# Patient Record
Sex: FEMALE | Race: WHITE | NOT HISPANIC OR LATINO | Employment: OTHER | URBAN - METROPOLITAN AREA
[De-identification: names, ages, dates, MRNs, and addresses within clinical notes are randomized per-mention and may not be internally consistent; named-entity substitution may affect disease eponyms.]

---

## 2017-01-18 ENCOUNTER — CLINICAL SUPPORT (OUTPATIENT)
Dept: INFECTIOUS DISEASES | Facility: CLINIC | Age: 62
End: 2017-01-18
Payer: COMMERCIAL

## 2017-01-18 DIAGNOSIS — Z71.84 TRAVEL ADVICE ENCOUNTER: ICD-10-CM

## 2017-01-18 DIAGNOSIS — Z23 IMMUNIZATION DUE: ICD-10-CM

## 2017-01-18 PROCEDURE — 90632 HEPA VACCINE ADULT IM: CPT | Mod: S$GLB,,, | Performed by: INTERNAL MEDICINE

## 2017-01-18 PROCEDURE — 99999 PR PBB SHADOW E&M-EST. PATIENT-LVL I: CPT | Mod: PBBFAC,,,

## 2017-01-18 PROCEDURE — 90471 IMMUNIZATION ADMIN: CPT | Mod: S$GLB,,, | Performed by: INTERNAL MEDICINE

## 2017-03-08 ENCOUNTER — PATIENT MESSAGE (OUTPATIENT)
Dept: ADMINISTRATIVE | Facility: HOSPITAL | Age: 62
End: 2017-03-08

## 2017-03-20 DIAGNOSIS — F41.9 ANXIETY: ICD-10-CM

## 2017-03-20 RX ORDER — DULOXETIN HYDROCHLORIDE 30 MG/1
CAPSULE, DELAYED RELEASE ORAL
Qty: 90 CAPSULE | Refills: 0 | Status: SHIPPED | OUTPATIENT
Start: 2017-03-20 | End: 2017-03-28 | Stop reason: SDUPTHER

## 2017-03-20 NOTE — TELEPHONE ENCOUNTER
Spoke with patient about getting a new PCP.  Patient has an appointment with Dr Lucia on 03/28/17.

## 2017-03-28 ENCOUNTER — LAB VISIT (OUTPATIENT)
Dept: LAB | Facility: HOSPITAL | Age: 62
End: 2017-03-28
Attending: INTERNAL MEDICINE
Payer: COMMERCIAL

## 2017-03-28 ENCOUNTER — OFFICE VISIT (OUTPATIENT)
Dept: INTERNAL MEDICINE | Facility: CLINIC | Age: 62
End: 2017-03-28
Payer: COMMERCIAL

## 2017-03-28 VITALS
SYSTOLIC BLOOD PRESSURE: 122 MMHG | HEART RATE: 60 BPM | HEIGHT: 66 IN | OXYGEN SATURATION: 97 % | WEIGHT: 123.25 LBS | BODY MASS INDEX: 19.81 KG/M2 | DIASTOLIC BLOOD PRESSURE: 68 MMHG

## 2017-03-28 DIAGNOSIS — E55.9 VITAMIN D DEFICIENCY: ICD-10-CM

## 2017-03-28 DIAGNOSIS — Z13.220 ENCOUNTER FOR LIPID SCREENING FOR CARDIOVASCULAR DISEASE: ICD-10-CM

## 2017-03-28 DIAGNOSIS — Z00.00 ANNUAL PHYSICAL EXAM: Primary | ICD-10-CM

## 2017-03-28 DIAGNOSIS — Z13.6 ENCOUNTER FOR LIPID SCREENING FOR CARDIOVASCULAR DISEASE: ICD-10-CM

## 2017-03-28 DIAGNOSIS — F41.9 ANXIETY: ICD-10-CM

## 2017-03-28 DIAGNOSIS — F51.04 CHRONIC INSOMNIA: ICD-10-CM

## 2017-03-28 LAB
25(OH)D3+25(OH)D2 SERPL-MCNC: 42 NG/ML
ALBUMIN SERPL BCP-MCNC: 3.6 G/DL
ALP SERPL-CCNC: 63 U/L
ALT SERPL W/O P-5'-P-CCNC: 14 U/L
ANION GAP SERPL CALC-SCNC: 7 MMOL/L
AST SERPL-CCNC: 20 U/L
BILIRUB SERPL-MCNC: 0.8 MG/DL
BUN SERPL-MCNC: 18 MG/DL
CALCIUM SERPL-MCNC: 9.4 MG/DL
CHLORIDE SERPL-SCNC: 104 MMOL/L
CO2 SERPL-SCNC: 28 MMOL/L
CREAT SERPL-MCNC: 0.8 MG/DL
EST. GFR  (AFRICAN AMERICAN): >60 ML/MIN/1.73 M^2
EST. GFR  (NON AFRICAN AMERICAN): >60 ML/MIN/1.73 M^2
GLUCOSE SERPL-MCNC: 87 MG/DL
HDLC SERPL-MCNC: 155 MG/DL
HDLC SERPL-MCNC: 64 MG/DL
POTASSIUM SERPL-SCNC: 4.1 MMOL/L
PROT SERPL-MCNC: 6.9 G/DL
SODIUM SERPL-SCNC: 139 MMOL/L
T3 SERPL-MCNC: 97 NG/DL
T4 FREE SERPL-MCNC: 1.06 NG/DL
TSH SERPL DL<=0.005 MIU/L-ACNC: 0.83 UIU/ML

## 2017-03-28 PROCEDURE — 83701 LIPOPROTEIN BLD HR FRACTION: CPT

## 2017-03-28 PROCEDURE — 36415 COLL VENOUS BLD VENIPUNCTURE: CPT | Mod: PO

## 2017-03-28 PROCEDURE — 84480 ASSAY TRIIODOTHYRONINE (T3): CPT

## 2017-03-28 PROCEDURE — 84443 ASSAY THYROID STIM HORMONE: CPT

## 2017-03-28 PROCEDURE — 82306 VITAMIN D 25 HYDROXY: CPT

## 2017-03-28 PROCEDURE — 99999 PR PBB SHADOW E&M-EST. PATIENT-LVL III: CPT | Mod: PBBFAC,,, | Performed by: INTERNAL MEDICINE

## 2017-03-28 PROCEDURE — 80053 COMPREHEN METABOLIC PANEL: CPT

## 2017-03-28 PROCEDURE — 84439 ASSAY OF FREE THYROXINE: CPT

## 2017-03-28 PROCEDURE — 82465 ASSAY BLD/SERUM CHOLESTEROL: CPT

## 2017-03-28 PROCEDURE — 83718 ASSAY OF LIPOPROTEIN: CPT

## 2017-03-28 PROCEDURE — 99396 PREV VISIT EST AGE 40-64: CPT | Mod: S$GLB,,, | Performed by: INTERNAL MEDICINE

## 2017-03-28 RX ORDER — DULOXETIN HYDROCHLORIDE 30 MG/1
30 CAPSULE, DELAYED RELEASE ORAL DAILY
Qty: 90 CAPSULE | Refills: 0 | Status: SHIPPED | OUTPATIENT
Start: 2017-03-28 | End: 2017-07-11

## 2017-03-28 NOTE — MR AVS SNAPSHOT
[facility]   [place]  [place] [address]  Phone: [phone]  Fax: [phone]                  Gabriela Skinner   3/28/2017 1:20 PM   Office Visit    Description:  Female : 1955   Provider:  Zachery Lucia MD   Department:  [facility]           Reason for Visit     Establish Care           Diagnoses this Visit        Comments    Annual physical exam    -  Primary     Anxiety         Chronic insomnia         Encounter for lipid screening for cardiovascular disease         Vitamin D deficiency                To Do List           Future Appointments        Provider Department Dept Phone    3/28/2017 2:30 PM [facility] [phone]    2017 1:00 PM Zachery Lucia MD [facility] [phone]      Goals (5 Years of Data)     None       These Medications        Disp Refills Start End    duloxetine (CYMBALTA) 30 MG capsule 90 capsule 0 3/28/2017     Take 1 capsule (30 mg total) by mouth once daily. - Oral    [id number] [place] [address] [address] [facility] AT [facility] [address] Ph #: [phone]         [facility]     [facility] -  Assistance  Registered nurses in the [facility] provide clinical advisement, health education, appointment booking, and other advisory services.  Call for this free service at [phone].             Medications           Message regarding Medications     Verify the changes and/or additions to your medication regime listed below are the same as discussed with your clinician today.  If any of these changes or additions are incorrect, please notify your healthcare provider.        CHANGE how you are taking these medications     Start Taking Instead of    duloxetine (CYMBALTA) 30 MG capsule duloxetine (CYMBALTA) 30 MG capsule    Dosage:  Take 1 capsule (30 mg total) by mouth once daily.  "Dosage:  TAKE 1 CAPSULE BY MOUTH ONCE A DAY    Reason for Change:  Reorder       STOP taking these medications     norethindrone-ethinyl estradiol (FEMHRT 1/5) 1-5 mg-mcg Tab Take 1 tablet by mouth.    risedronate (ACTONEL) 150 MG tablet Take 1 tablet by mouth.           Verify that the below list of medications is an accurate representation of the medications you are currently taking.  If none reported, the list may be blank. If incorrect, please contact your healthcare provider. Carry this list with you in case of emergency.           Current Medications     duloxetine (CYMBALTA) 30 MG capsule Take 1 capsule (30 mg total) by mouth once daily.    LOPREEZA 1-0.5 mg per tablet ONE T PO QD           Clinical Reference Information           Your Vitals Were     BP Pulse Height Weight SpO2 BMI    122/68 (BP Location: Right arm, Patient Position: Sitting, BP Method: Manual) 60 5' 6" (1.676 m) 55.9 kg (123 lb 3.8 oz) 97% 19.89 kg/m2      Blood Pressure          Most Recent Value    BP  122/68      Allergies as of 3/28/2017     No Known Drug Allergies      Immunizations Administered on Date of Encounter - 3/28/2017     None      Orders Placed During Today's Visit     Future Labs/Procedures Expected by Expires    Cholesterol, total  3/28/2017 5/27/2018    Comprehensive metabolic panel  3/28/2017 6/26/2017    HDL CHOLESTEROL  3/28/2017 5/27/2018    LDL CHOLESTEROL, DIRECT  3/28/2017 5/27/2018    T3  3/28/2017 6/26/2017    T4, free  3/28/2017 6/26/2017    TSH  3/28/2017 5/27/2018    Vitamin D  3/28/2017 5/27/2018      Instructions            Anxiety Reaction  Anxiety is the feeling we all get when we think something bad might happen. It is a normal response to stress and usually causes only a mild reaction. When anxiety becomes more severe, it can interfere with daily life. In some cases, you may not even be aware of what it is youre anxious about. There may also be a genetic link or it may be a learned behavior in the " home.  Both psychological and physical triggers cause stress reaction. It's often a response to fear or emotional stress, real or imagined. This stress may come from home, family, work, or social relationships.  During an anxiety reaction, you may feel:  · Helpless  · Nervous  · Depressed  · Irritable  Your body may show signs of anxiety in many ways. You may experience:  · Dry mouth  · Shakiness  · Dizziness  · Weakness  · Trouble breathing  · Breathing fast (hyperventilating)  · Chest pressure  · Sweating  · Headache  · Nausea  · Diarrhea  · Tiredness  · Inability to sleep  · Sexual problems  Home care  · Try to locate the sources of stress in your life. They may not be obvious. These may include:  ¨ Daily hassles of life (traffic jams, missed appointments, car troubles, etc.)  ¨ Major life changes, both good (new baby, job promotion) and bad (loss of job, loss of loved one)  ¨ Overload: feeling that you have too many responsibilities and can't take care of all of them at once  ¨ Feeling helpless, feeling that your problems are beyond what youre able to solve  · Notice how your body reacts to stress. Learn to listen to your body signals. This will help you take action before the stress becomes severe.  · When you can, do something about the source of your stress. (Avoid hassles, limit the amount of change that happens in your life at one time and take a break when you feel overloaded).  · Unfortunately, many stressful situations can't be avoided. It is necessary to learn how to better manage stress. There are many proven methods that will reduce your anxiety. These include simple things like exercise, good nutrition and adequate rest. Also, there are certain techniques that are helpful:  ¨ Relaxation  ¨ Breathing exercises  ¨ Visualization  ¨ Biofeedback  ¨ Meditation  For more information about this, consult your doctor or go to a local bookstore and review the many books and tapes available on this  "subject.  Follow-up care  If you feel that your anxiety is not responding to self-help measures, contact your doctor or make an appointment with a counselor. You may need short-term psychological counseling and temporary medicine to help you manage stress.  Call 911  Call your healthcare provider right away if any of these occur:  · Trouble breathing  · Confusion  · Drowsiness or trouble wakening  · Fainting or loss of consciousness  · Rapid heart rate  · Seizure  · New chest pain that becomes more severe, lasts longer, or spreads into your shoulder, arm, neck, jaw, or back  When to seek medical advice  Call your healthcare provider right away if any of these occur:  · Your symptoms get worse  · Severe headache not relieved by rest and mild pain reliever  Date Last Reviewed: 9/29/2015  © 7927-3966 [employer]. [address] [place] [address], [place] [address]. All rights reserved. This information is not intended as a substitute for professional medical care. Always follow your healthcare professional's instructions.        Stress Relief: A Positive Lifestyle  Learning to manage stress doesn't happen overnight. It's a process. The more you keep at it, the more you'll feel in control of daily events.     Leave plenty of time for family fun. Have a cookout or play games together. And try to share at least one meal each day.        Set limits  Trying to fit too much into a day is a major cause of stress. Setting limits will help you feel more in control. This sometimes means saying no--to people and even to things you might want to do. This can be hard at times. But knowing your priorities can help you make choices.  Know your priorities  Using your time and energy wisely is a good way to control stress. Save time for the things that matter most in your life. Ask yourself: Do I really need to do this? Do I want to do this? If you answer "yes," go ahead. But keep in mind you can also answer "no."  Learn to accept " support  Everybody needs support now and then. So don't feel embarrassed to ask for help when you need it. Most people are glad to lend a hand. And asking for help can open up new lines of communication and friendships.  Stress and children  Be careful not to take stress out on your children. They may not understand why you're stressed. But they can sense your moods. Be aware that many children--especially teenagers--are under stress, too. So plan time to talk with your kids. Ask them about school and any problems they're having. Finally, make sure they have plenty of time for just being kids and having fun.  Be part of your community  Taking part in community or destini-based events can offer a sense of belonging. It also helps put you in touch with active and caring people nearby. So whether it's a cleanup day at a local park or taking meals to the elderly, try to reach out to friends and neighbors. Just remember: Taking time for yourself once in a while makes it easier to help others later on.   Date Last Reviewed: 12/22/2014  © 6407-2288 [employer]. [address] [place] [address]. All rights reserved. This information is not intended as a substitute for professional medical care. Always follow your healthcare professional's instructions.        Stress Relief: Activities  When you're feeling stressed, some simple exercises can provide relief right away. These exercises are not the kind you need sweatpants for. You can do them almost anytime and anywhere. They will help you feel more relaxed.  Walking    Taking a walk is a great way to fight stress. Walking offers a chance to take a break from a stressful situation. It can also give you a few minutes to think things through. Even a short walk can help you feel better. That's because walking is a positive action that you control.     Stretching  Muscle tension is a common response to stress. Stretching is a simple way to loosen up. Try  these:  · Neck stretch. Sit up straight and tuck in your chin. Place your left hand on the right side of your head. Gently pull your head to the left and hold for 10 seconds. Switch sides and repeat the exercise.  · Shoulder and arm stretch. Put your hands together and lock your fingers. Then raise your hands above your head, palms upward. Hold for 15 seconds and relax. Repeat 3 times.  Deep breathing  Deep breathing is a simple method for relieving tension. Use 3 deep breaths each time you do this exercise.  · Inhale. Breathe in slowly and deeply through your nose. Take in as much air as possible. Hold for 3 seconds.  · Exhale. Breathe out slowly through your mouth. Try pursing your lips as if you were going to whistle. This helps control how fast you exhale.  Date Last Reviewed: 12/22/2014  © 3835-4086 [employer]. [address] [place] [address], [place] [address]. All rights reserved. This information is not intended as a substitute for professional medical care. Always follow your healthcare professional's instructions.        Stress Relief: Changing Your Response  You are the only person responsible for your thoughts and actions. This simple idea is your most powerful tool for managing stress. Start by having realistic expectations. Then learn to recognize what you can--and can't--control. Finally, think about ways to change your response. With practice, you can learn to let go of stressful ways of thinking.  Have realistic expectations    When it comes to events that cause you stress, ask yourself:  · What are my expectations?  · How likely is it that my expectations, good or bad, will be met? Are they realistic?  · If they aren't met, do I have to respond by feeling badly? How can I work with other outcomes?  Understand what you can do  To get better at managing stress, try these tips:  · Put the stressor in perspective. Will being late to work really get you fired?  · Be flexible and look for answers.  "If you're stuck in traffic, try calling to let people know you're on the way.  · Plan ahead for next time. If being late is a worry, plan to leave a few minutes earlier.  Make mountains into molehills  A common cause of stress is feeling as if you have to solve all your problems at once. To shake this feeling, learn to take things one step at a time. Try to break big problems into smaller tasks that you can handle. That way, worries that seem like mountains become little hills you can climb over. Remember, taking small steps will carry you forward.   Date Last Reviewed: 12/22/2014 © 2000-2016 [employer]. [address] [place] [address], [place] [address]. All rights reserved. This information is not intended as a substitute for professional medical care. Always follow your healthcare professional's instructions.        Stress Relief: Relaxation  Focusing the mind helps provide stress relief. Taking 5 to 10 minutes to practice relaxation each day helps you feel more refreshed. The following exercises can be done almost anywhere. Try one or more until you find what works best for you.  Calm your mind    Find a quiet place where you won't be disturbed. Then try the following:  · Sit comfortably. Take off your shoes. Turn off your cell phone and pager. Take a few deep breaths.  · Focus your mind on one peaceful thought, image, or word. Then try to hold that thought for 5 minutes.  · When other thoughts enter your mind, relax and refocus. Let the invading thoughts fall away.  · When you're done, stand up slowly and stretch your arms over your head. With practice, this exercise can help you feel restored.     Calm your body  With practice, you can use mental cues to tell your body how to feel.  · Sit comfortably and clear your mind. A few deep breaths will help.  · Mentally focus on your left hand and repeat to yourself, "My left hand feels warm and heavy." Keep doing this until your hand does feel heavier and " warmer.  · Repeat the exercise using your right hand. Then focus on your arms, legs, and feet until your whole body feels relaxed.  · When you're done, stand up slowly and stretch your arms overhead.     Visualization  Visualization is like taking a mental vacation. It frees your mind while keeping your body in a calm state. To get started, picture yourself feeling warm and relaxed. Choose a peaceful setting that appeals to you and fill in the details. If you imagine a tropical beach, listen to the waves on the shore. Feel the sun on your face. Dig your toes in the sand. By using the power of your mind, you can take a soothing break when you need to.  Date Last Reviewed: 12/22/2014 © 2000-2016 [employer]. [address] [place] [address], [place] [address]. All rights reserved. This information is not intended as a substitute for professional medical care. Always follow your healthcare professional's instructions.        Identifying Causes of Stress    Things that cause stress (stressors) can be everyday events, major life changes, or a combination of things. They can be either happy or sad events. Knowing your stressors will help you find ways to manage your stress.  Minor hassles  Daily life is filled with little annoyances. Spilled milk, lost keys, a missed phone call. These are rarely earth-shattering events. But the stress they cause can build up over time. Minor hassles also seem more painful if you're under long-term stress.  Major changes  A move, a divorce, or the loss of a loved one are major changes. They require you to adapt to a new lifestyle. You may fear an unknown future or worry about whether you'll be able to cope. Even positive events, like marriage or the birth of a baby, can cause major stress.  Stress overload  Being pulled in many directions can be exhausting--especially when you're juggling work and family. Working late, taking kids to soccer, paying bills, and buying groceries may feel  "completely overwhelming. For a time, life can seem totally out of control.  Feeling helpless  Feeling helpless is a sign of long-term stress. You may feel like you have no control over your life. Even a faraway disaster told on the evening news may seem like it's part of your own troubles. Over time, these feelings may lead to depression. If you feel "down" for weeks, talk with your doctor or a counselor. Depression can be treated.  Date Last Reviewed: 12/22/2014  © 4918-1622 "[employer]". [address] [place] [address]. All rights reserved. This information is not intended as a substitute for professional medical care. Always follow your healthcare professional's instructions.             Language Assistance Services     ATTENTION: Language assistance services are available, free of charge. Please call [phone].      ATENCIÓN: Si vika lopez, tiene a atkinson disposición servicios gratuitos de asistencia lingüística. Llame al [phone].     DONTAE Ý: N?u b?n nói Ti?ng Vi?t, có các d?ch v? h? tr? ngôn ng? mi?n phí dành cho b?n. G?i s? [phone].         [facility] complies with applicable Federal civil rights laws and does not discriminate on the basis of race, color, national origin, age, disability, or sex.        "

## 2017-03-28 NOTE — PATIENT INSTRUCTIONS
Anxiety Reaction  Anxiety is the feeling we all get when we think something bad might happen. It is a normal response to stress and usually causes only a mild reaction. When anxiety becomes more severe, it can interfere with daily life. In some cases, you may not even be aware of what it is youre anxious about. There may also be a genetic link or it may be a learned behavior in the home.  Both psychological and physical triggers cause stress reaction. It's often a response to fear or emotional stress, real or imagined. This stress may come from home, family, work, or social relationships.  During an anxiety reaction, you may feel:  · Helpless  · Nervous  · Depressed  · Irritable  Your body may show signs of anxiety in many ways. You may experience:  · Dry mouth  · Shakiness  · Dizziness  · Weakness  · Trouble breathing  · Breathing fast (hyperventilating)  · Chest pressure  · Sweating  · Headache  · Nausea  · Diarrhea  · Tiredness  · Inability to sleep  · Sexual problems  Home care  · Try to locate the sources of stress in your life. They may not be obvious. These may include:  ¨ Daily hassles of life (traffic jams, missed appointments, car troubles, etc.)  ¨ Major life changes, both good (new baby, job promotion) and bad (loss of job, loss of loved one)  ¨ Overload: feeling that you have too many responsibilities and can't take care of all of them at once  ¨ Feeling helpless, feeling that your problems are beyond what youre able to solve  · Notice how your body reacts to stress. Learn to listen to your body signals. This will help you take action before the stress becomes severe.  · When you can, do something about the source of your stress. (Avoid hassles, limit the amount of change that happens in your life at one time and take a break when you feel overloaded).  · Unfortunately, many stressful situations can't be avoided. It is necessary to learn how to better manage stress. There are many proven  methods that will reduce your anxiety. These include simple things like exercise, good nutrition and adequate rest. Also, there are certain techniques that are helpful:  ¨ Relaxation  ¨ Breathing exercises  ¨ Visualization  ¨ Biofeedback  ¨ Meditation  For more information about this, consult your doctor or go to a local bookstore and review the many books and tapes available on this subject.  Follow-up care  If you feel that your anxiety is not responding to self-help measures, contact your doctor or make an appointment with a counselor. You may need short-term psychological counseling and temporary medicine to help you manage stress.  Call 911  Call your healthcare provider right away if any of these occur:  · Trouble breathing  · Confusion  · Drowsiness or trouble wakening  · Fainting or loss of consciousness  · Rapid heart rate  · Seizure  · New chest pain that becomes more severe, lasts longer, or spreads into your shoulder, arm, neck, jaw, or back  When to seek medical advice  Call your healthcare provider right away if any of these occur:  · Your symptoms get worse  · Severe headache not relieved by rest and mild pain reliever  Date Last Reviewed: 9/29/2015  © 7706-6126 [employer]. [address] [place] [address]. All rights reserved. This information is not intended as a substitute for professional medical care. Always follow your healthcare professional's instructions.        Stress Relief: A Positive Lifestyle  Learning to manage stress doesn't happen overnight. It's a process. The more you keep at it, the more you'll feel in control of daily events.     Leave plenty of time for family fun. Have a cookout or play games together. And try to share at least one meal each day.        Set limits  Trying to fit too much into a day is a major cause of stress. Setting limits will help you feel more in control. This sometimes means saying no--to people and even to things you might want to  "do. This can be hard at times. But knowing your priorities can help you make choices.  Know your priorities  Using your time and energy wisely is a good way to control stress. Save time for the things that matter most in your life. Ask yourself: Do I really need to do this? Do I want to do this? If you answer "yes," go ahead. But keep in mind you can also answer "no."  Learn to accept support  Everybody needs support now and then. So don't feel embarrassed to ask for help when you need it. Most people are glad to lend a hand. And asking for help can open up new lines of communication and friendships.  Stress and children  Be careful not to take stress out on your children. They may not understand why you're stressed. But they can sense your moods. Be aware that many children--especially teenagers--are under stress, too. So plan time to talk with your kids. Ask them about school and any problems they're having. Finally, make sure they have plenty of time for just being kids and having fun.  Be part of your community  Taking part in community or destini-based events can offer a sense of belonging. It also helps put you in touch with active and caring people nearby. So whether it's a cleanup day at a local park or taking meals to the elderly, try to reach out to friends and neighbors. Just remember: Taking time for yourself once in a while makes it easier to help others later on.   Date Last Reviewed: 12/22/2014  © 2029-4679 [employer]. [address] [place] [address], [place] [address]. All rights reserved. This information is not intended as a substitute for professional medical care. Always follow your healthcare professional's instructions.        Stress Relief: Activities  When you're feeling stressed, some simple exercises can provide relief right away. These exercises are not the kind you need sweatpants for. You can do them almost anytime and anywhere. They will help you feel more " relaxed.  Walking    Taking a walk is a great way to fight stress. Walking offers a chance to take a break from a stressful situation. It can also give you a few minutes to think things through. Even a short walk can help you feel better. That's because walking is a positive action that you control.     Stretching  Muscle tension is a common response to stress. Stretching is a simple way to loosen up. Try these:  · Neck stretch. Sit up straight and tuck in your chin. Place your left hand on the right side of your head. Gently pull your head to the left and hold for 10 seconds. Switch sides and repeat the exercise.  · Shoulder and arm stretch. Put your hands together and lock your fingers. Then raise your hands above your head, palms upward. Hold for 15 seconds and relax. Repeat 3 times.  Deep breathing  Deep breathing is a simple method for relieving tension. Use 3 deep breaths each time you do this exercise.  · Inhale. Breathe in slowly and deeply through your nose. Take in as much air as possible. Hold for 3 seconds.  · Exhale. Breathe out slowly through your mouth. Try pursing your lips as if you were going to whistle. This helps control how fast you exhale.  Date Last Reviewed: 12/22/2014  © 6081-4344 [employer]. [address] [place] [address], [place] [address]. All rights reserved. This information is not intended as a substitute for professional medical care. Always follow your healthcare professional's instructions.        Stress Relief: Changing Your Response  You are the only person responsible for your thoughts and actions. This simple idea is your most powerful tool for managing stress. Start by having realistic expectations. Then learn to recognize what you can--and can't--control. Finally, think about ways to change your response. With practice, you can learn to let go of stressful ways of thinking.  Have realistic expectations    When it comes to events that cause you stress, ask  yourself:  · What are my expectations?  · How likely is it that my expectations, good or bad, will be met? Are they realistic?  · If they aren't met, do I have to respond by feeling badly? How can I work with other outcomes?  Understand what you can do  To get better at managing stress, try these tips:  · Put the stressor in perspective. Will being late to work really get you fired?  · Be flexible and look for answers. If you're stuck in traffic, try calling to let people know you're on the way.  · Plan ahead for next time. If being late is a worry, plan to leave a few minutes earlier.  Make mountains into molehills  A common cause of stress is feeling as if you have to solve all your problems at once. To shake this feeling, learn to take things one step at a time. Try to break big problems into smaller tasks that you can handle. That way, worries that seem like mountains become little hills you can climb over. Remember, taking small steps will carry you forward.   Date Last Reviewed: 12/22/2014 © 2000-2016 [employer]. [address] [place] [address]. All rights reserved. This information is not intended as a substitute for professional medical care. Always follow your healthcare professional's instructions.        Stress Relief: Relaxation  Focusing the mind helps provide stress relief. Taking 5 to 10 minutes to practice relaxation each day helps you feel more refreshed. The following exercises can be done almost anywhere. Try one or more until you find what works best for you.  Calm your mind    Find a quiet place where you won't be disturbed. Then try the following:  · Sit comfortably. Take off your shoes. Turn off your cell phone and pager. Take a few deep breaths.  · Focus your mind on one peaceful thought, image, or word. Then try to hold that thought for 5 minutes.  · When other thoughts enter your mind, relax and refocus. Let the invading thoughts fall away.  · When you're done,  "stand up slowly and stretch your arms over your head. With practice, this exercise can help you feel restored.     Calm your body  With practice, you can use mental cues to tell your body how to feel.  · Sit comfortably and clear your mind. A few deep breaths will help.  · Mentally focus on your left hand and repeat to yourself, "My left hand feels warm and heavy." Keep doing this until your hand does feel heavier and warmer.  · Repeat the exercise using your right hand. Then focus on your arms, legs, and feet until your whole body feels relaxed.  · When you're done, stand up slowly and stretch your arms overhead.     Visualization  Visualization is like taking a mental vacation. It frees your mind while keeping your body in a calm state. To get started, picture yourself feeling warm and relaxed. Choose a peaceful setting that appeals to you and fill in the details. If you imagine a tropical beach, listen to the waves on the shore. Feel the sun on your face. Dig your toes in the sand. By using the power of your mind, you can take a soothing break when you need to.  Date Last Reviewed: 12/22/2014  © 9599-5272 [employer]. [address] [place] [address], [place] [address]. All rights reserved. This information is not intended as a substitute for professional medical care. Always follow your healthcare professional's instructions.        Identifying Causes of Stress    Things that cause stress (stressors) can be everyday events, major life changes, or a combination of things. They can be either happy or sad events. Knowing your stressors will help you find ways to manage your stress.  Minor hassles  Daily life is filled with little annoyances. Spilled milk, lost keys, a missed phone call. These are rarely earth-shattering events. But the stress they cause can build up over time. Minor hassles also seem more painful if you're under long-term stress.  Major changes  A move, a divorce, or the loss of a loved one are " "major changes. They require you to adapt to a new lifestyle. You may fear an unknown future or worry about whether you'll be able to cope. Even positive events, like marriage or the birth of a baby, can cause major stress.  Stress overload  Being pulled in many directions can be exhausting--especially when you're juggling work and family. Working late, taking kids to soccer, paying bills, and buying groceries may feel completely overwhelming. For a time, life can seem totally out of control.  Feeling helpless  Feeling helpless is a sign of long-term stress. You may feel like you have no control over your life. Even a faraway disaster told on the evening news may seem like it's part of your own troubles. Over time, these feelings may lead to depression. If you feel "down" for weeks, talk with your doctor or a counselor. Depression can be treated.  Date Last Reviewed: 12/22/2014  © 8962-6648 [employer]. [address] [place] [address], [place] [address]. All rights reserved. This information is not intended as a substitute for professional medical care. Always follow your healthcare professional's instructions.        "

## 2017-03-31 LAB — LDLC SERPL-MCNC: 85 MG/DL

## 2017-04-02 NOTE — PROGRESS NOTES
Portions of this note are generated with voice recognition software. Typographical errors may exist.     SUBJECTIVE:    This is a/an 61 y.o. female here for primary care visit for  Chief Complaint   Patient presents with    [facility]    Insomnia               Medications Reviewed and Updated    Past medical, family, and social histories were reviewed and updated.    Review of Systems negative unless otherwise noted in history of present illness-   General ROS: negative  Psychological ROS: negative  ENT ROS: negative  Allergy and Immunology ROS: negative  Cardiovascular ROS: negative  Gastrointestinal ROS: negative  Genito-Urinary ROS: negative  Musculoskeletal ROS: negative  Neurological ROS: negative  Dermatological ROS: negative      Allergic:    Review of patient's allergies indicates:   Allergen Reactions    No known drug allergies        OBJECTIVE:  BP: 122/68 Pulse: 60    Wt Readings from Last 3 Encounters:   03/28/17 55.9 kg (123 lb 3.8 oz)   07/18/16 60.1 kg (132 lb 7.9 oz)   01/20/16 61.5 kg (135 lb 9.6 oz)    Body mass index is 19.89 kg/(m^2).  Previous Blood Pressure Readings :   BP Readings from Last 3 Encounters:   03/28/17 122/68   07/18/16 127/84   01/20/16 100/70       GEN: No apparent distress  HEENT: sclera non-icteric, conjunctiva clear  CV: no peripheral edema.  Regular rate and rhythm.  No murmurs.  PULM: breathing non-labored no wheezing bilateral lung fields.  ABD: non protuberant abdomen.  PSYCH: appropriate affect  MSK: able to rise from chair without assistance  SKIN: normal skin turgor    Pertinent Labs Reviewed       ASSESSMENT/PLAN:    Annual physical exam    Anxiety  -     duloxetine (CYMBALTA) 30 MG capsule; Take 1 capsule (30 mg total) by mouth once daily.  Dispense: 90 capsule; Refill: 0  -     Comprehensive metabolic panel; Future; Expected date: 3/28/17  -     TSH; Future; Expected date: 3/28/17  -     T4, free; Future; Expected date: 3/28/17  -     T3; Future; Expected  date: 3/28/17    Chronic insomnia  -     duloxetine (CYMBALTA) 30 MG capsule; Take 1 capsule (30 mg total) by mouth once daily.  Dispense: 90 capsule; Refill: 0    Encounter for lipid screening for cardiovascular disease  -     Cholesterol, total; Future; Expected date: 3/28/17  -     HDL CHOLESTEROL; Future; Expected date: 3/28/17  -     LDL CHOLESTEROL, DIRECT; Future; Expected date: 3/28/17    Vitamin D deficiency  -     Vitamin D; Future; Expected date: 3/28/17          Future Appointments  Date Time Provider Department Center   7/11/2017 1:00 PM Zachery Lucia MD [facility]       Zachery Lucia  4/2/2017  9:11 AM

## 2017-06-20 ENCOUNTER — OFFICE VISIT (OUTPATIENT)
Dept: PSYCHIATRY | Facility: CLINIC | Age: 62
End: 2017-06-20
Payer: COMMERCIAL

## 2017-06-20 DIAGNOSIS — F41.1 ANXIETY STATE: Primary | ICD-10-CM

## 2017-06-20 PROCEDURE — 90791 PSYCH DIAGNOSTIC EVALUATION: CPT | Mod: S$GLB,,, | Performed by: SOCIAL WORKER
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
## 2017-06-22 ENCOUNTER — PATIENT MESSAGE (OUTPATIENT)
Dept: INTERNAL MEDICINE | Facility: CLINIC | Age: 62
End: 2017-06-22

## 2017-07-11 ENCOUNTER — OFFICE VISIT (OUTPATIENT)
Dept: INTERNAL MEDICINE | Facility: CLINIC | Age: 62
End: 2017-07-11
Payer: COMMERCIAL

## 2017-07-11 VITALS
SYSTOLIC BLOOD PRESSURE: 108 MMHG | OXYGEN SATURATION: 95 % | BODY MASS INDEX: 18.92 KG/M2 | WEIGHT: 117.75 LBS | DIASTOLIC BLOOD PRESSURE: 64 MMHG | HEART RATE: 55 BPM | HEIGHT: 66 IN

## 2017-07-11 DIAGNOSIS — G25.81 RESTLESS LEGS: Primary | ICD-10-CM

## 2017-07-11 DIAGNOSIS — H61.20 CERUMEN DEBRIS ON TYMPANIC MEMBRANE, UNSPECIFIED LATERALITY: ICD-10-CM

## 2017-07-11 DIAGNOSIS — F41.9 ANXIETY: ICD-10-CM

## 2017-07-11 PROCEDURE — 99999 PR PBB SHADOW E&M-EST. PATIENT-LVL III: CPT | Mod: PBBFAC,,, | Performed by: INTERNAL MEDICINE

## 2017-07-11 PROCEDURE — 99214 OFFICE O/P EST MOD 30 MIN: CPT | Mod: S$GLB,,, | Performed by: INTERNAL MEDICINE

## 2017-07-11 NOTE — PATIENT INSTRUCTIONS
Recommendations for today    Symptoms today seem to be related to excessive anxiousness.  We recommend restarting the medication Cymbalta.  If you do not want to restart Cymbalta at the very least we recommend returning to clinic with a counselor to explore other methods to improve anxiousness and insomnia.

## 2017-07-11 NOTE — PROGRESS NOTES
Portions of this note are generated with voice recognition software. Typographical errors may exist.     SUBJECTIVE:    This is a/an 61 y.o. female here for primary care visit for  Chief Complaint   Patient presents with    Otalgia     right x 1 month    Anxiety     Patient states that she had multiple somatic symptoms after stopping Cymbalta.  She struggles to put things in context.  States that she has chronic issues with fidgeting but states that since stopping Cymbalta it has gotten worse.  States that symptoms happen during the daytime and during the nighttime but they simply bother her more at night time.  She does not have a diagnosis of restless leg syndrome.  Endorses worsening insomnia and anxiousness and stopping Cymbalta.  She doesn't have lumbosacral radiculopathy history.  Patient does not trust prescription medication and voices a strong preference to abstain from Cymbalta if necessary.  She is using herbal supplement tumeric instead.      Patient voices feelings of abandonment regarding having multiple somatic symptoms when stopping Cymbalta.  States that she feels that her medical providers did not prepare her appropriately for the possibility of the symptoms.  Patient states that she did not call medical providers for support because she felt angry.  Voices reluctance to reestablish care with psychology because of feelings of abandonment.      Medications Reviewed and Updated    Past medical, family, and social histories were reviewed and updated.    Review of Systems negative unless otherwise noted in history of present illness-   General ROS: negative  Psychological ROS: negative  ENT ROS: negative  Allergy and Immunology ROS: negative  Genito-Urinary ROS: negative  Musculoskeletal ROS: negative  Neurological ROS: negative  Dermatological ROS: negative    Allergic:    Review of patient's allergies indicates:   Allergen Reactions    No known drug allergies        OBJECTIVE:  BP: 108/64 Pulse:  (!) 55    Wt Readings from Last 3 Encounters:   07/11/17 53.4 kg (117 lb 11.6 oz)   03/28/17 55.9 kg (123 lb 3.8 oz)   07/18/16 60.1 kg (132 lb 7.9 oz)    Body mass index is 19 kg/m².  Previous Blood Pressure Readings :   BP Readings from Last 3 Encounters:   07/11/17 108/64   03/28/17 122/68   07/18/16 127/84       GEN: No apparent distress  HEENT: sclera non-icteric, conjunctiva clear  CV: no peripheral edema  PULM: breathing non-labored  ABD: non, protuberant abdomen.  PSYCH:   Flight of ideas.  Normal thought content.  Slightly pressured speech.  Significant fidgeting  MSK: able to rise from chair without assistance  SKIN: normal skin turgor    Pertinent Labs Reviewed       ASSESSMENT/PLAN:    Restless legs.  Etiology unclear.  Doubt classic restless leg syndrome.  Would not expect Cymbalta to improve symptoms. in fact would cause quite the opposite.  No clear secondary cause for restless leg syndrome.  Recommend patient resume pharmacotherapy for anxiety disorder and follow with psychology    Anxiety.Condition not optimally controlled. Detailed counseling on self care measures. Plan to monitor clinically in addition to plan below.       Future Appointments  Date Time Provider Department Center   9/12/2017 1:40 PM Zachery Lucia MD [facility]       Zachery Lucia  7/11/2017  1:41 PM

## 2017-08-21 ENCOUNTER — OFFICE VISIT (OUTPATIENT)
Dept: PSYCHIATRY | Facility: CLINIC | Age: 62
End: 2017-08-21
Payer: COMMERCIAL

## 2017-08-21 DIAGNOSIS — F41.1 ANXIETY STATE: Primary | ICD-10-CM

## 2017-08-21 PROCEDURE — 90834 PSYTX W PT 45 MINUTES: CPT | Mod: S$GLB,,, | Performed by: SOCIAL WORKER
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
## 2017-09-12 ENCOUNTER — OFFICE VISIT (OUTPATIENT)
Dept: INTERNAL MEDICINE | Facility: CLINIC | Age: 62
End: 2017-09-12
Payer: COMMERCIAL

## 2017-09-12 VITALS
BODY MASS INDEX: 19.24 KG/M2 | TEMPERATURE: 97 F | WEIGHT: 119.69 LBS | HEART RATE: 62 BPM | HEIGHT: 66 IN | SYSTOLIC BLOOD PRESSURE: 102 MMHG | OXYGEN SATURATION: 98 % | DIASTOLIC BLOOD PRESSURE: 68 MMHG

## 2017-09-12 DIAGNOSIS — Z23 NEED FOR IMMUNIZATION AGAINST INFLUENZA: ICD-10-CM

## 2017-09-12 DIAGNOSIS — E55.9 VITAMIN D DEFICIENCY: ICD-10-CM

## 2017-09-12 DIAGNOSIS — Z13.6 ENCOUNTER FOR LIPID SCREENING FOR CARDIOVASCULAR DISEASE: ICD-10-CM

## 2017-09-12 DIAGNOSIS — F41.9 ANXIETY: Primary | ICD-10-CM

## 2017-09-12 DIAGNOSIS — Z13.220 ENCOUNTER FOR LIPID SCREENING FOR CARDIOVASCULAR DISEASE: ICD-10-CM

## 2017-09-12 PROCEDURE — 90688 IIV4 VACCINE SPLT 0.5 ML IM: CPT | Mod: S$GLB,,, | Performed by: INTERNAL MEDICINE

## 2017-09-12 PROCEDURE — 99999 PR PBB SHADOW E&M-EST. PATIENT-LVL III: CPT | Mod: PBBFAC,,, | Performed by: INTERNAL MEDICINE

## 2017-09-12 PROCEDURE — 3008F BODY MASS INDEX DOCD: CPT | Mod: S$GLB,,, | Performed by: INTERNAL MEDICINE

## 2017-09-12 PROCEDURE — 99213 OFFICE O/P EST LOW 20 MIN: CPT | Mod: 25,S$GLB,, | Performed by: INTERNAL MEDICINE

## 2017-09-12 PROCEDURE — 90471 IMMUNIZATION ADMIN: CPT | Mod: S$GLB,,, | Performed by: INTERNAL MEDICINE

## 2017-09-12 NOTE — PATIENT INSTRUCTIONS
Wrist Pronation (Strength)    These instructions are for your right elbow. Switch sides for your left elbow.   1. Sit in a chair next to a table. Rest your right forearm on the table. Hang your right wrist off the edge of the table, palm up. Hold a hand weight in your right hand. Your healthcare provider will tell you what size of hand weight to use.  2. Keep your forearm in place and turn your wrist to the left until your thumb is on top.  3. Slowly lower the hand weight back down to the right.  4. Repeat 10 times, or as instructed.  Date Last Reviewed: 3/10/2016  © 3449-9960 [employer]. [address] [place] [address]. All rights reserved. This information is not intended as a substitute for professional medical care. Always follow your healthcare professional's instructions.        Wrist Supination (Strength)  These instructions are for your right elbow. Switch sides for your left elbow.   5. Sit in a chair next to a table. Rest your right forearm on the table. Hang your right wrist off the edge of the table, palm down. Hold a hand weight in your right hand. Your healthcare provider will tell you what size of hand weight to use.  6. Keep your forearm in place and turn your wrist to the right until your thumb is on top.  7. Slowly lower the hand weight back down to the left.  8. Repeat 10 times, or as instructed.    Date Last Reviewed: 3/10/2016  © 5391-4548 [employer]. [address] [place] [address]. All rights reserved. This information is not intended as a substitute for professional medical care. Always follow your healthcare professional's instructions.        Wrist Flexion (Strength)     This exercise is written for your right elbow. Switch sides for your left elbow.  9. Sit in a chair next to a table. Rest your right forearm on the table. Hang your right wrist off the edge of the table, palm up. Hold a hand weight in your right hand. Your healthcare  provider will tell you what size of hand weight to use.  10. Keep your forearm in place and bend your wrist upward to lift the weight. Hold for 5 seconds.  11. Slowly lower the hand weight back down.  12. Repeat 5 times, or as instructed.  Date Last Reviewed: 3/10/2016  © 3332-9389 [employer]. [address] [place] [address]. All rights reserved. This information is not intended as a substitute for professional medical care. Always follow your healthcare professional's instructions.        Wrist Extension (Strength)     This exercise is written for your right elbow. Switch sides for your left elbow.  13. Sit in a chair. Hold a hand weight in your right hand. Your healthcare provider will tell you what size of hand weight to use.  14. Lean your forearm on your thigh or a table. Hang your wrist off the end of your knee or edge of the table, palm down.  15. Keep your forearm in place and bend your wrist upward. Lift the hand weight as high as you can.  16. Slowly lower the hand weight back down.  17. Repeat 5 times, or as instructed.  Date Last Reviewed: 3/10/2016  © 3069-2060 [employer]. [address] [place] [address]. All rights reserved. This information is not intended as a substitute for professional medical care. Always follow your healthcare professional's instructions.

## 2017-09-17 NOTE — PROGRESS NOTES
Portions of this note are generated with voice recognition software. Typographical errors may exist.     SUBJECTIVE:    This is a/an 62 y.o. female here for primary care visit for  Chief Complaint   Patient presents with    Follow-up      off cymbalta     Patient states that she doesn't want to do pharmacotherapy for anxiety but is willing to continue with counseling.  Ex    Patient had a ground-level fall several weeks ago associated with dancing.  States that this was not a significant injury.  Denies history of osteoporotic or pathologic fracture.  Denies alcohol misuse or abuse.  States that she continues to receive supervision with bone density scan by outside gynecologist.  She is not engaged in any resistance training program to prevent osteoporotic fracture.  She is interested in pursuing this.      Medications Reviewed and Updated    Past medical, family, and social histories were reviewed and updated.    Review of Systems negative unless otherwise noted in history of present illness-  ROS  Answers for HPI/ROS submitted by the patient on 9/11/2017   activity change: No  unexpected weight change: No  rhinorrhea: No  trouble swallowing: No  visual disturbance: No  chest tightness: No  polyuria: No  difficulty urinating: No  menstrual problem: No  joint swelling: No  arthralgias: No  confusion: No  dysphoric mood: No        Allergic:    Review of patient's allergies indicates:   Allergen Reactions    No known drug allergies        OBJECTIVE:  BP: 102/68 Pulse: 62 Temp: 97.3 °F (36.3 °C)  Wt Readings from Last 3 Encounters:   09/12/17 54.3 kg (119 lb 11.4 oz)   07/11/17 53.4 kg (117 lb 11.6 oz)   03/28/17 55.9 kg (123 lb 3.8 oz)    Body mass index is 19.32 kg/m².  Previous Blood Pressure Readings :   BP Readings from Last 3 Encounters:   09/12/17 102/68   07/11/17 108/64   03/28/17 122/68       Physical Exam    GEN: No apparent distress  HEENT: sclera non-icteric, conjunctiva clear  CV: no peripheral  edema  PULM: breathing non-labored  ABD: non, protuberant abdomen.  PSYCH: anxious affect  MSK: able to rise from chair without assistance  SKIN: normal skin turgor    Pertinent Labs Reviewed       ASSESSMENT/PLAN:    Need for immunization against influenza  -     Influenza - Quadrivalent (3 years & older)    Anxiety  -     TSH; Future; Expected date: 09/12/2017  -     Comprehensive metabolic panel; Future; Expected date: 09/12/2017    Vitamin D deficiency  -     Comprehensive metabolic panel; Future; Expected date: 09/12/2017  -     Vitamin D; Future; Expected date: 09/12/2017    Encounter for lipid screening for cardiovascular disease  -     Lipid panel; Future; Expected date: 09/12/2017          Future Appointments  Date Time Provider Department Center   10/9/2017 2:00 PM Wade Phipps LCSW [facility]   10/16/2017 2:00 PM MIKKI SouzaW [facility]   10/23/2017 1:00 PM MIKKI SouzaW [facility]   10/30/2017 1:00 PM MIKKI SouzaW [facility]   11/6/2017 1:00 PM MIKKI SouzaW [facility]   11/29/2017 2:00 PM Kierra Sierra MD [facility] [place]   4/13/2018 8:00 AM LAB, [facility] [place]   4/18/2018 1:00 PM Zachery Lucia MD [facility]  9/16/2017  10:41 PM

## 2017-10-09 ENCOUNTER — OFFICE VISIT (OUTPATIENT)
Dept: PSYCHIATRY | Facility: CLINIC | Age: 62
End: 2017-10-09
Payer: COMMERCIAL

## 2017-10-09 DIAGNOSIS — F41.1 GENERALIZED ANXIETY DISORDER: Primary | ICD-10-CM

## 2017-10-09 PROCEDURE — 90834 PSYTX W PT 45 MINUTES: CPT | Mod: S$GLB,,, | Performed by: SOCIAL WORKER
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
## 2017-10-16 ENCOUNTER — OFFICE VISIT (OUTPATIENT)
Dept: PSYCHIATRY | Facility: CLINIC | Age: 62
End: 2017-10-16
Payer: COMMERCIAL

## 2017-10-16 DIAGNOSIS — F41.1 GENERALIZED ANXIETY DISORDER: Primary | ICD-10-CM

## 2017-10-16 PROCEDURE — 90834 PSYTX W PT 45 MINUTES: CPT | Mod: S$GLB,,, | Performed by: SOCIAL WORKER

## 2017-10-16 NOTE — PROGRESS NOTES
Individual Psychotherapy (PhD/LCSW)    10/16/2017    Site:  [facility]         Therapeutic Intervention: Met with patient.  Outpatient - Insight oriented psychotherapy 45 min - CPT code 81051    Chief complaint/reason for encounter: anxiety Diana      Interval history and content of current session:     Self doubts.  Fear of not being approved or wanting to do well.    Did homework.  Will read next chapter and correct thoughts.            Treatment plan:  · Target symptoms: anxiety   · Why chosen therapy is appropriate versus another modality: relevant to diagnosis  · Outcome monitoring methods: self-report, observation  · Therapeutic intervention type: behavior modifying psychotherapy    Risk parameters:  Patient reports no suicidal ideation  Patient reports no homicidal ideation  Patient reports no self-injurious behavior  Patient reports no violent behavior    Verbal deficits: None    Patient's response to intervention:  The patient's response to intervention is accepting.    Progress toward goals and other mental status changes:  The patient's progress toward goals is fair .    Diagnosis:   generalized anxiety     Plan:  individual psychotherapy    Return to clinic: 1 week    Length of Service (minutes): 45

## 2017-10-23 ENCOUNTER — OFFICE VISIT (OUTPATIENT)
Dept: PSYCHIATRY | Facility: CLINIC | Age: 62
End: 2017-10-23
Payer: COMMERCIAL

## 2017-10-23 DIAGNOSIS — F41.1 GENERALIZED ANXIETY DISORDER: Primary | ICD-10-CM

## 2017-10-23 PROCEDURE — 90834 PSYTX W PT 45 MINUTES: CPT | Mod: S$GLB,,, | Performed by: SOCIAL WORKER

## 2017-10-23 NOTE — PROGRESS NOTES
Individual Psychotherapy (PhD/LCSW)    10/23/2017    Site:  [facility]         Therapeutic Intervention: Met with patient.  Outpatient - Insight oriented psychotherapy 45 min - CPT code 89437    Chief complaint/reason for encounter: anxiety Diana      Interval history and content of current session:     She wonders if lady at Yoga being too close bothers her because her space does not matter.  I will keep in mind the idea of mindfulness.    Cars getting in front of her bothers her as well as when someone wants her to move over.   Should statements and not wanting to bother others predominate.              Treatment plan:  · Target symptoms: anxiety   · Why chosen therapy is appropriate versus another modality: relevant to diagnosis  · Outcome monitoring methods: self-report, observation  · Therapeutic intervention type: behavior modifying psychotherapy    Risk parameters:  Patient reports no suicidal ideation  Patient reports no homicidal ideation  Patient reports no self-injurious behavior  Patient reports no violent behavior    Verbal deficits: None    Patient's response to intervention:  The patient's response to intervention is accepting.    Progress toward goals and other mental status changes:  The patient's progress toward goals is fair .    Diagnosis:   generalized anxiety     Plan:  individual psychotherapy    Return to clinic: 1 week    Length of Service (minutes): 45

## 2017-11-06 ENCOUNTER — OFFICE VISIT (OUTPATIENT)
Dept: PSYCHIATRY | Facility: CLINIC | Age: 62
End: 2017-11-06
Payer: COMMERCIAL

## 2017-11-06 DIAGNOSIS — F41.1 GENERALIZED ANXIETY DISORDER: Primary | ICD-10-CM

## 2017-11-06 PROCEDURE — 90834 PSYTX W PT 45 MINUTES: CPT | Mod: S$GLB,,, | Performed by: SOCIAL WORKER
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
## 2017-11-29 ENCOUNTER — OFFICE VISIT (OUTPATIENT)
Dept: DERMATOLOGY | Facility: CLINIC | Age: 62
End: 2017-11-29
Payer: COMMERCIAL

## 2017-11-29 DIAGNOSIS — D22.9 NEVUS: ICD-10-CM

## 2017-11-29 DIAGNOSIS — D18.00 ANGIOMA: ICD-10-CM

## 2017-11-29 DIAGNOSIS — L24.9 IRRITANT CONTACT DERMATITIS, UNSPECIFIED TRIGGER: Primary | ICD-10-CM

## 2017-11-29 DIAGNOSIS — L81.4 LENTIGO: ICD-10-CM

## 2017-11-29 DIAGNOSIS — L82.1 SK (SEBORRHEIC KERATOSIS): ICD-10-CM

## 2017-11-29 PROCEDURE — 99999 PR PBB SHADOW E&M-EST. PATIENT-LVL II: CPT | Mod: PBBFAC,,, | Performed by: DERMATOLOGY

## 2017-11-29 PROCEDURE — 99202 OFFICE O/P NEW SF 15 MIN: CPT | Mod: S$GLB,,, | Performed by: DERMATOLOGY

## 2017-11-29 RX ORDER — HYDROCORTISONE BUTYRATE 1 MG/G
CREAM TOPICAL
Qty: 45 G | Refills: 1 | Status: SHIPPED | OUTPATIENT
Start: 2017-11-29

## 2017-11-29 NOTE — PROGRESS NOTES
Subjective:       Patient ID:  Gabriela Skinner is a 62 y.o. female who presents for   Chief Complaint   Patient presents with    Rash    Skin Check     UBSE     Patient here for intermittent rash under both arms x few months, occ itch no prev tx     UBSE today- pt has hx of extensive sun exposure in the past       Rash         Review of Systems   Constitutional: Negative.  Negative for fever, chills, fatigue and malaise.   Skin: Positive for itching, rash and activity-related sunscreen use. Negative for daily sunscreen use.   Hematologic/Lymphatic: Does not bruise/bleed easily.        Objective:    Physical Exam   Constitutional: She appears well-developed and well-nourished. No distress.   Neurological: She is alert and oriented to person, place, and time. She is not disoriented.   Psychiatric: She has a normal mood and affect.   Skin:   Areas Examined (abnormalities noted in diagram):   Scalp / Hair Palpated and Inspected  Head / Face Inspection Performed  Neck Inspection Performed  Chest / Axilla Inspection Performed  Abdomen Inspection Performed  Back Inspection Performed  RUE Inspected  LUE Inspection Performed  Nails and Digits Inspection Performed                   Diagram Legend     Erythematous scaling macule/papule c/w actinic keratosis       Vascular papule c/w angioma      Pigmented verrucoid papule/plaque c/w seborrheic keratosis      Yellow umbilicated papule c/w sebaceous hyperplasia      Irregularly shaped tan macule c/w lentigo     1-2 mm smooth white papules consistent with Milia      Movable subcutaneous cyst with punctum c/w epidermal inclusion cyst      Subcutaneous movable cyst c/w pilar cyst      Firm pink to brown papule c/w dermatofibroma      Pedunculated fleshy papule(s) c/w skin tag(s)      Evenly pigmented macule c/w junctional nevus     Mildly variegated pigmented, slightly irregular-bordered macule c/w mildly atypical nevus      Flesh colored to evenly pigmented papule c/w  intradermal nevus       Pink pearly papule/plaque c/w basal cell carcinoma      Erythematous hyperkeratotic cursted plaque c/w SCC      Surgical scar with no sign of skin cancer recurrence      Open and closed comedones      Inflammatory papules and pustules      Verrucoid papule consistent consistent with wart     Erythematous eczematous patches and plaques     Dystrophic onycholytic nail with subungual debris c/w onychomycosis     Umbilicated papule    Erythematous-base heme-crusted tan verrucoid plaque consistent with inflamed seborrheic keratosis     Erythematous Silvery Scaling Plaque c/w Psoriasis     See annotation      Assessment / Plan:        Irritant contact dermatitis, unspecified trigger  Good skin care regimen discussed including limiting to one bath or shower/day, using lukewarm water with mild soap and moisturizing cream to skin 1 - 2x/day. Brochure was provided and reviewed with patient.    -     hydrocortisone butyrate (LOCOID) 0.1 % Crea cream; AAA qhs prn irritation  Dispense: 45 g; Refill: 1    Angioma  These are benign vascular lesions that are inherited.  Treatment is not necessary.    SK (seborrheic keratosis)  These are benign inherited growths without a malignant potential. Reassurance given to patient. No treatment is necessary.     Lentigo  This is a benign hyperpigmented sun induced lesion. Daily sun protection will reduce the number of new lesions. Treatment of these benign lesions are considered cosmetic.  The nature of sun-induced photo-aging and skin cancers is discussed.  Sun avoidance, protective clothing, and the use of 30-SPF sunscreens is advised. Observe closely for skin damage/changes, and call if such occurs.    Elta daily tinted  DEJ eye cream , alternate with teamine    Nevus  Discussed ABCDE's of nevi.  Monitor for new mole or moles that are becoming bigger, darker, irritated, or developing irregular borders. Brochure provided.      Upper body skin examination performed  today including at least 6 points as noted in physical examination. No lesions suspicious for malignancy noted.             Return in about 1 year (around 11/29/2018).

## 2017-12-04 ENCOUNTER — OFFICE VISIT (OUTPATIENT)
Dept: PSYCHIATRY | Facility: CLINIC | Age: 62
End: 2017-12-04
Payer: COMMERCIAL

## 2017-12-04 DIAGNOSIS — F41.1 GENERALIZED ANXIETY DISORDER: Primary | ICD-10-CM

## 2017-12-04 PROCEDURE — 90834 PSYTX W PT 45 MINUTES: CPT | Mod: S$GLB,,, | Performed by: SOCIAL WORKER
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
## 2017-12-11 ENCOUNTER — OFFICE VISIT (OUTPATIENT)
Dept: PSYCHIATRY | Facility: CLINIC | Age: 62
End: 2017-12-11
Payer: COMMERCIAL

## 2017-12-11 DIAGNOSIS — F41.1 GENERALIZED ANXIETY DISORDER: Primary | ICD-10-CM

## 2017-12-11 PROCEDURE — 90834 PSYTX W PT 45 MINUTES: CPT | Mod: S$GLB,,, | Performed by: SOCIAL WORKER
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
## 2017-12-18 ENCOUNTER — OFFICE VISIT (OUTPATIENT)
Dept: PSYCHIATRY | Facility: CLINIC | Age: 62
End: 2017-12-18
Payer: COMMERCIAL

## 2017-12-18 DIAGNOSIS — F41.1 GENERALIZED ANXIETY DISORDER: Primary | ICD-10-CM

## 2017-12-18 PROCEDURE — 90791 PSYCH DIAGNOSTIC EVALUATION: CPT | Mod: S$GLB,,, | Performed by: SOCIAL WORKER

## 2017-12-18 NOTE — PROGRESS NOTES
Individual Psychotherapy (PhD/LCSW)    12/18/2017    Site:  [facility]         Therapeutic Intervention: Met with patient.  Outpatient - Insight oriented psychotherapy 45 min - CPT code 26576    Chief complaint/reason for encounter: anxiety Diana      Interval history and content of current session:    Came in at 2.  We processed her experience at coming on time rather than very early.  She will do this again.  Trend of judging self discussed.  Mindfulness applied.   For homewrok, read next chapter, record thoughts,  Fill out paperwork on behavioral task of leaving house at 130 pm, get notebook and use it when needs to cut projects into smaller tasks.                Treatment plan:  · Target symptoms: anxiety   · Why chosen therapy is appropriate versus another modality: relevant to diagnosis  · Outcome monitoring methods: self-report, observation  · Therapeutic intervention type: behavior modifying psychotherapy    Risk parameters:  Patient reports no suicidal ideation  Patient reports no homicidal ideation  Patient reports no self-injurious behavior  Patient reports no violent behavior    Verbal deficits: None    Patient's response to intervention:  The patient's response to intervention is accepting.    Progress toward goals and other mental status changes:  The patient's progress toward goals is fair .    Diagnosis:   generalized anxiety     Plan:  individual psychotherapy    Return to clinic: 1 week    Length of Service (minutes): 45

## 2018-01-05 ENCOUNTER — TELEPHONE (OUTPATIENT)
Dept: INTERNAL MEDICINE | Facility: CLINIC | Age: 63
End: 2018-01-05

## 2018-01-05 ENCOUNTER — PATIENT MESSAGE (OUTPATIENT)
Dept: INTERNAL MEDICINE | Facility: CLINIC | Age: 63
End: 2018-01-05

## 2018-01-15 ENCOUNTER — OFFICE VISIT (OUTPATIENT)
Dept: PSYCHIATRY | Facility: CLINIC | Age: 63
End: 2018-01-15
Payer: COMMERCIAL

## 2018-01-15 DIAGNOSIS — F41.1 GENERALIZED ANXIETY DISORDER: Primary | ICD-10-CM

## 2018-01-15 PROCEDURE — 90834 PSYTX W PT 45 MINUTES: CPT | Mod: S$GLB,,, | Performed by: SOCIAL WORKER

## 2018-01-15 NOTE — PROGRESS NOTES
"Individual Psychotherapy (PhD/LCSW)    1/15/2018    Site:  [facility]         Therapeutic Intervention: Met with patient.  Outpatient - Insight oriented psychotherapy 45 min - CPT code 81194    Chief complaint/reason for encounter: anxiety Diana      Interval history and content of current session:      Sister in law  And partner [place]    Pt noted referring to herself in terms of terrible or horrible.  Got irritate at .    He is not trying "hard enough" to get her sister to change.    Cognitive therapy.   Pt wants to start antidepressant again.  She will discuss with Dr. Lucia.   In past she reports did well with lexapro.               Treatment plan:  · Target symptoms: anxiety    · Why chosen therapy is appropriate versus another modality: relevant to diagnosis  · Outcome monitoring methods: self-report, observation  · Therapeutic intervention type: behavior modifying psychotherapy    Risk parameters:  Patient reports no suicidal ideation  Patient reports no homicidal ideation  Patient reports no self-injurious behavior  Patient reports no violent behavior    Verbal deficits: None    Patient's response to intervention:  The patient's response to intervention is accepting.    Progress toward goals and other mental status changes:  The patient's progress toward goals is fair .    Diagnosis:   generalized anxiety     Plan:  individual psychotherapy    Return to clinic: 1 week    Length of Service (minutes): 45   "

## 2018-01-18 ENCOUNTER — PATIENT MESSAGE (OUTPATIENT)
Dept: INTERNAL MEDICINE | Facility: CLINIC | Age: 63
End: 2018-01-18

## 2018-01-18 DIAGNOSIS — F41.9 ANXIETY: Primary | ICD-10-CM

## 2018-01-22 RX ORDER — ESCITALOPRAM OXALATE 5 MG/1
5 TABLET ORAL DAILY
Qty: 30 TABLET | Refills: 1 | Status: SHIPPED | OUTPATIENT
Start: 2018-01-22 | End: 2018-02-26 | Stop reason: SDUPTHER

## 2018-01-24 ENCOUNTER — PATIENT MESSAGE (OUTPATIENT)
Dept: INTERNAL MEDICINE | Facility: CLINIC | Age: 63
End: 2018-01-24

## 2018-01-29 ENCOUNTER — OFFICE VISIT (OUTPATIENT)
Dept: PSYCHIATRY | Facility: CLINIC | Age: 63
End: 2018-01-29
Payer: COMMERCIAL

## 2018-01-29 DIAGNOSIS — F41.1 GENERALIZED ANXIETY DISORDER: Primary | ICD-10-CM

## 2018-01-29 PROCEDURE — 90834 PSYTX W PT 45 MINUTES: CPT | Mod: S$GLB,,, | Performed by: SOCIAL WORKER

## 2018-01-29 NOTE — PROGRESS NOTES
Individual Psychotherapy (PhD/LCSW)    1/29/2018    Site:  [facility]         Therapeutic Intervention: Met with patient.  Outpatient - Insight oriented psychotherapy 45 min - CPT code 57429    Chief complaint/reason for encounter: anxiety Diana      Interval history and content of current session:    Pt did use adelita on CBT.  She wrote her worry over moving to [place].  I reviewed her overestimation of something bad happening to .   In addition some exposure to her fears was used  (for desensitized acceptance).  Routinely she has used avoidance by going to sleep in the past.   Her anxiety has continued to be high.   She has started lexapro at 5 mg.                          Treatment plan:  · Target symptoms: anxiety    · Why chosen therapy is appropriate versus another modality: relevant to diagnosis  · Outcome monitoring methods: self-report, observation  · Therapeutic intervention type: behavior modifying psychotherapy    Risk parameters:  Patient reports no suicidal ideation  Patient reports no homicidal ideation  Patient reports no self-injurious behavior  Patient reports no violent behavior    Verbal deficits: None    Patient's response to intervention:  The patient's response to intervention is accepting.    Progress toward goals and other mental status changes:  The patient's progress toward goals is fair .    Diagnosis:   generalized anxiety     Plan:  individual psychotherapy    Return to clinic: 1 week    Length of Service (minutes): 45

## 2018-02-05 ENCOUNTER — OFFICE VISIT (OUTPATIENT)
Dept: PSYCHIATRY | Facility: CLINIC | Age: 63
End: 2018-02-05
Payer: COMMERCIAL

## 2018-02-05 DIAGNOSIS — F41.1 GENERALIZED ANXIETY DISORDER: Primary | ICD-10-CM

## 2018-02-05 PROCEDURE — 90834 PSYTX W PT 45 MINUTES: CPT | Mod: S$GLB,,, | Performed by: SOCIAL WORKER

## 2018-02-05 NOTE — PROGRESS NOTES
Individual Psychotherapy (PhD/LCSW)    2/5/2018    Site:  [facility]         Therapeutic Intervention: Met with patient.  Outpatient - Insight oriented psychotherapy 45 min - CPT code 97939    Chief complaint/reason for encounter: anxiety Diana      Interval history and content of current session:          Did her homework.   Did mindful meditation; what's the worse outcome, noticing a pattern of self criticism.   Pt has been anxious again.  She is being challenged by the idea of moving to [place] especially.                       Treatment plan:  · Target symptoms: anxiety    · Why chosen therapy is appropriate versus another modality: relevant to diagnosis  · Outcome monitoring methods: self-report, observation  · Therapeutic intervention type: behavior modifying psychotherapy    Risk parameters:  Patient reports no suicidal ideation  Patient reports no homicidal ideation  Patient reports no self-injurious behavior  Patient reports no violent behavior    Verbal deficits: None    Patient's response to intervention:  The patient's response to intervention is accepting.    Progress toward goals and other mental status changes:  The patient's progress toward goals is fair .    Diagnosis:   generalized anxiety     Plan:  individual psychotherapy    Return to clinic: 1 week    Length of Service (minutes): 45

## 2018-02-26 ENCOUNTER — OFFICE VISIT (OUTPATIENT)
Dept: INTERNAL MEDICINE | Facility: CLINIC | Age: 63
End: 2018-02-26
Payer: COMMERCIAL

## 2018-02-26 VITALS
SYSTOLIC BLOOD PRESSURE: 112 MMHG | OXYGEN SATURATION: 98 % | BODY MASS INDEX: 19.16 KG/M2 | HEIGHT: 66 IN | HEART RATE: 62 BPM | DIASTOLIC BLOOD PRESSURE: 78 MMHG | WEIGHT: 119.25 LBS

## 2018-02-26 DIAGNOSIS — F41.1 GAD (GENERALIZED ANXIETY DISORDER): Primary | ICD-10-CM

## 2018-02-26 PROCEDURE — 99212 OFFICE O/P EST SF 10 MIN: CPT | Mod: S$GLB,,, | Performed by: INTERNAL MEDICINE

## 2018-02-26 PROCEDURE — 99999 PR PBB SHADOW E&M-EST. PATIENT-LVL III: CPT | Mod: PBBFAC,,, | Performed by: INTERNAL MEDICINE

## 2018-02-26 RX ORDER — ESCITALOPRAM OXALATE 10 MG/1
10 TABLET ORAL DAILY
Qty: 90 TABLET | Refills: 1 | Status: SHIPPED | OUTPATIENT
Start: 2018-02-26 | End: 2018-09-06 | Stop reason: SDUPTHER

## 2018-02-26 NOTE — PATIENT INSTRUCTIONS
Recommendations for today    We recommend that you contact major insurance company and ask for information about supplemental insurance plans for Medicare in the [place] area.    AFTER CARE INSTRUCTIONS   · The name of your medicine is citalopram    · .If mild side effects develop on citalopram 10 mg simply continue taking the medication.  Mild side effects tend to go away after taking the medication for 7 days.  If side effects persist or worsen go back to 5 mg and contact the clinic.    · This medicine can cause you to either have more energy or make you sleepy. If it makes you sleepy take it at night. If it makes you have energy take it in the morning    · You will start on a low-dose of this medication.  This is to avoid any unwanted side effects or to let yourbody get use to mild side effects until they go away.     · Depression\anxiety symptoms do not typically get better until you have been on the medication for at least 3-4 weeks.  Therefore we will likely need to communicate with you after 3-4 weeks of therapy to determine if you need increased dosage of medication.    · This medicine may give you some stomach upset . Give this some time. If it is minor it should go away on it's own.     · If it does not go away or it bothers you, please call our office. Do NOT stop taking it all of a sudden. Stopping this medicine suddenly can cause your mood symptoms to come back in an unpleasant way.    · This is not generally a life threatening problem but if your mood symptoms worsen and you have persistent thoughts of hurting yourself or others, please call [phone] TALK or [phone]        Never let this medicine bottle . You don't want to risk losing your supply of medicine.             Recommend, start shopping for medicare supplmental insurance plans    Shopping policies [place]    Start big names, insurance, [employer], BCBS, Athem    Start using up the 5 mg tab left. Take 2 of them total 10 mg once  daily    Pharmacy. 5mg  10 mg stegnth,     2-3 weeks if symptoms are not    7 days.     Pay special attention how higher enefy level. Sleepy.

## 2018-02-27 ENCOUNTER — OFFICE VISIT (OUTPATIENT)
Dept: PSYCHIATRY | Facility: CLINIC | Age: 63
End: 2018-02-27
Payer: COMMERCIAL

## 2018-02-27 DIAGNOSIS — F41.1 GENERALIZED ANXIETY DISORDER: Primary | ICD-10-CM

## 2018-02-27 PROCEDURE — 90834 PSYTX W PT 45 MINUTES: CPT | Mod: S$GLB,,, | Performed by: SOCIAL WORKER

## 2018-02-27 NOTE — PROGRESS NOTES
Individual Psychotherapy (PhD/LCSW)    2/27/2018    Site:  [facility]         Therapeutic Intervention: Met with patient.  Outpatient - Insight oriented psychotherapy 45 min - CPT code 15009    Chief complaint/reason for encounter: anxiety Diana      Interval history and content of current session:          Lots of cognitive work.   Long discussion of the usefulness vs un usefulness of excessive worries.   Pt continues to work on homework.  She will calculate income vs expenses of new house.  Exposure to her fear/avoidant behavior.  Has been feeling better but also has one less thing to worry about since daughter just got .                        Treatment plan:  · Target symptoms: anxiety    · Why chosen therapy is appropriate versus another modality: relevant to diagnosis  · Outcome monitoring methods: self-report, observation  · Therapeutic intervention type: behavior modifying psychotherapy    Risk parameters:  Patient reports no suicidal ideation  Patient reports no homicidal ideation  Patient reports no self-injurious behavior  Patient reports no violent behavior    Verbal deficits: None    Patient's response to intervention:  The patient's response to intervention is accepting.    Progress toward goals and other mental status changes:  The patient's progress toward goals is fair .    Diagnosis:   generalized anxiety     Plan:  individual psychotherapy    Return to clinic: 1 week    Length of Service (minutes): 45

## 2018-03-02 NOTE — PROGRESS NOTES
Portions of this note are generated with voice recognition software. Typographical errors may exist.     SUBJECTIVE:    This is a/an 62 y.o. female here for primary care visit for  Chief Complaint   Patient presents with    Anxiety     follow up     Vitamin D Deficiency     follow up      Patient continues to struggle with anxiety.  States that she is specifically struggling with situational anxiety anticipating relocation to [place].  Patient having trouble with anticipatory planning and transition of care.  Patient has been tolerating Lexapro 5 mg and is considering increasing the dosage to better help with anxiety associated with adjusting to new residence.  She will be retiring when she moved to [place].        Medications Reviewed and Updated    Past medical, family, and social histories were reviewed and updated.    Review of Systems negative unless otherwise noted in history of present illness-  ROS    General ROS: negative  Psychological ROS: negative  ENT ROS: negative  Endocrine ROS: Negative  Allergy and Immunology ROS: negative  Cardiovascular ROS: negative  Pulmonary ROS: Negative  Gastrointestinal ROS: negative  Genito-Urinary ROS: negative  Musculoskeletal ROS: negative  Neurological ROS: negative  Dermatological ROS: negative        Allergic:    Review of patient's allergies indicates:   Allergen Reactions    No known drug allergies        OBJECTIVE:  BP: 112/78 Pulse: 62    Wt Readings from Last 3 Encounters:   02/26/18 54.1 kg (119 lb 4.3 oz)   09/12/17 54.3 kg (119 lb 11.4 oz)   07/11/17 53.4 kg (117 lb 11.6 oz)    Body mass index is 19.25 kg/m².  Previous Blood Pressure Readings :   BP Readings from Last 3 Encounters:   02/26/18 112/78   09/12/17 102/68   07/11/17 108/64       Physical Exam    GEN: No apparent distress  HEENT: sclera non-icteric, conjunctiva clear  CV: no peripheral edema  PULM: breathing non-labored  ABD: non, protuberant abdomen.  PSYCH: anxious affect.  Normal thought  content.  Nonpressured speech.  MSK: able to rise from chair without assistance  SKIN: normal skin turgor    Pertinent Labs Reviewed       ASSESSMENT/PLAN:    KEARA (generalized anxiety disorder).This is a Acute on Chronic problem. The etiology is known. The problem is not adequately controlled. The risk of medical complications is moderate. Treatment/diagnostic recommendations are to modify the diagnostic/treatment plan as follows. The patient advised if symptoms change or intensify to seek medical care.   -     escitalopram oxalate (LEXAPRO) 10 MG tablet; Take 1 tablet (10 mg total) by mouth once daily.  Dispense: 90 tablet; Refill: 1      Future Appointments  Date Time Provider Department Center   3/27/2018 7:00 AM LAB, ALLEGRA KENH [facility]   3/28/2018 1:40 PM Zachery Lucia MD [facility]   4/9/2018 2:00 PM DONTE Souza   4/16/2018 2:00 PM DONTE Souza  3/2/2018  3:44 PM

## 2018-03-27 ENCOUNTER — LAB VISIT (OUTPATIENT)
Dept: LAB | Facility: HOSPITAL | Age: 63
End: 2018-03-27
Attending: INTERNAL MEDICINE
Payer: COMMERCIAL

## 2018-03-27 DIAGNOSIS — E55.9 VITAMIN D DEFICIENCY: ICD-10-CM

## 2018-03-27 DIAGNOSIS — F41.9 ANXIETY: ICD-10-CM

## 2018-03-27 DIAGNOSIS — Z13.220 ENCOUNTER FOR LIPID SCREENING FOR CARDIOVASCULAR DISEASE: ICD-10-CM

## 2018-03-27 DIAGNOSIS — Z13.6 ENCOUNTER FOR LIPID SCREENING FOR CARDIOVASCULAR DISEASE: ICD-10-CM

## 2018-03-27 LAB
25(OH)D3+25(OH)D2 SERPL-MCNC: 37 NG/ML
ALBUMIN SERPL BCP-MCNC: 3.5 G/DL
ALP SERPL-CCNC: 65 U/L
ALT SERPL W/O P-5'-P-CCNC: 23 U/L
ANION GAP SERPL CALC-SCNC: 7 MMOL/L
AST SERPL-CCNC: 23 U/L
BILIRUB SERPL-MCNC: 0.8 MG/DL
BUN SERPL-MCNC: 16 MG/DL
CALCIUM SERPL-MCNC: 9.1 MG/DL
CHLORIDE SERPL-SCNC: 105 MMOL/L
CHOLEST SERPL-MCNC: 174 MG/DL
CHOLEST/HDLC SERPL: 2.5 {RATIO}
CO2 SERPL-SCNC: 26 MMOL/L
CREAT SERPL-MCNC: 0.9 MG/DL
EST. GFR  (AFRICAN AMERICAN): >60 ML/MIN/1.73 M^2
EST. GFR  (NON AFRICAN AMERICAN): >60 ML/MIN/1.73 M^2
GLUCOSE SERPL-MCNC: 85 MG/DL
HDLC SERPL-MCNC: 71 MG/DL
HDLC SERPL: 40.8 %
LDLC SERPL CALC-MCNC: 88.4 MG/DL
NONHDLC SERPL-MCNC: 103 MG/DL
POTASSIUM SERPL-SCNC: 4 MMOL/L
PROT SERPL-MCNC: 6.7 G/DL
SODIUM SERPL-SCNC: 138 MMOL/L
TRIGL SERPL-MCNC: 73 MG/DL
TSH SERPL DL<=0.005 MIU/L-ACNC: 2.57 UIU/ML

## 2018-03-27 PROCEDURE — 36415 COLL VENOUS BLD VENIPUNCTURE: CPT | Mod: PO

## 2018-03-27 PROCEDURE — 84443 ASSAY THYROID STIM HORMONE: CPT

## 2018-03-27 PROCEDURE — 82306 VITAMIN D 25 HYDROXY: CPT

## 2018-03-27 PROCEDURE — 80053 COMPREHEN METABOLIC PANEL: CPT

## 2018-03-27 PROCEDURE — 80061 LIPID PANEL: CPT

## 2018-03-28 ENCOUNTER — OFFICE VISIT (OUTPATIENT)
Dept: INTERNAL MEDICINE | Facility: CLINIC | Age: 63
End: 2018-03-28
Payer: COMMERCIAL

## 2018-03-28 VITALS
HEART RATE: 58 BPM | DIASTOLIC BLOOD PRESSURE: 78 MMHG | BODY MASS INDEX: 19.2 KG/M2 | WEIGHT: 119.5 LBS | OXYGEN SATURATION: 97 % | TEMPERATURE: 99 F | HEIGHT: 66 IN | SYSTOLIC BLOOD PRESSURE: 106 MMHG

## 2018-03-28 DIAGNOSIS — Z12.4 CERVICAL CANCER SCREENING: ICD-10-CM

## 2018-03-28 DIAGNOSIS — F41.1 GAD (GENERALIZED ANXIETY DISORDER): Primary | ICD-10-CM

## 2018-03-28 DIAGNOSIS — Z12.39 BREAST CANCER SCREENING: ICD-10-CM

## 2018-03-28 PROCEDURE — 99999 PR PBB SHADOW E&M-EST. PATIENT-LVL III: CPT | Mod: PBBFAC,,, | Performed by: INTERNAL MEDICINE

## 2018-03-28 PROCEDURE — 99213 OFFICE O/P EST LOW 20 MIN: CPT | Mod: S$GLB,,, | Performed by: INTERNAL MEDICINE

## 2018-03-28 RX ORDER — IBANDRONATE SODIUM 150 MG/1
1 TABLET, FILM COATED ORAL
Refills: 11 | COMMUNITY
Start: 2018-03-22

## 2018-03-28 NOTE — PATIENT INSTRUCTIONS
Recommendations for today    Please continue Lexapro 10 mg for now.  In the future if life circumstances improve and coping skills continue to strengthen you can consider taking a holiday with Lexapro with medical supervision.  You do not want to abruptly stop taking Lexapro.  You would need to have a medical provider train you on how to taper the medication.    Unless anxiety symptoms worsen over the next several months you do not need to return to the clinic.    At age 65 you can get pneumococcal\Prevnar vaccine without a prescription.  Simply go to a local pharmacy and request vaccination.

## 2018-03-28 NOTE — PROGRESS NOTES
Portions of this note are generated with voice recognition software. Typographical errors may exist.     SUBJECTIVE:    This is a/an 62 y.o. female here for primary care visit for  Chief Complaint   Patient presents with    Follow-up     KEARA     Anxiety symptoms are better.  Psychosocial factors contributing to worsening anxiety have resolved.  Patient successfully increased Lexapro dosage from 5-10 mg without adverse effects.  Patient is not motivated to increase the dosage at this time because of improvement in symptoms.  She wants to maintain Lexapro at 10 mg.  She is working on coping strategies with counseling at this time.    The patient recently completed mammogram at diagnostic imaging services [facility].  Results within normal limits.  Patient is not due for repeat colonoscopy.  She had this completed 2013.  Patient also had gynecologic exam recently including Pap test.  Results pending.    Answers for HPI/ROS submitted by the patient on 3/28/2018   activity change: No  unexpected weight change: No  rhinorrhea: No  trouble swallowing: No  visual disturbance: No  chest tightness: No  polyuria: No  difficulty urinating: No  menstrual problem: No  joint swelling: No  arthralgias: No  confusion: No  dysphoric mood: No      Medications Reviewed and Updated    Past medical, family, and social histories were reviewed and updated.    Review of Systems negative unless otherwise noted in history of present illness-  ROS    General ROS: negative  Psychological ROS: negative  ENT ROS: negative  Endocrine ROS: Negative  Allergy and Immunology ROS: negative  Cardiovascular ROS: negative  Pulmonary ROS: Negative  Gastrointestinal ROS: negative  Genito-Urinary ROS: negative      Allergic:    Review of patient's allergies indicates:   Allergen Reactions    No known drug allergies        OBJECTIVE:  BP: 106/78 Pulse: (!) 58 Temp: 98.5 °F (36.9 °C)  Wt Readings from Last 3 Encounters:   03/28/18 54.2 kg (119 lb 7.8  oz)   02/26/18 54.1 kg (119 lb 4.3 oz)   09/12/17 54.3 kg (119 lb 11.4 oz)    Body mass index is 19.29 kg/m².  Previous Blood Pressure Readings :   BP Readings from Last 3 Encounters:   03/28/18 106/78   02/26/18 112/78   09/12/17 102/68       Physical Exam    GEN: No apparent distress  HEENT: sclera non-icteric, conjunctiva clear  CV: no peripheral edema  PULM: breathing non-labored  ABD: non, protuberant abdomen.  PSYCH: appropriate affect  MSK: able to rise from chair without assistance  SKIN: normal skin turgor    Pertinent Labs Reviewed       ASSESSMENT/PLAN:    KEARA (generalized anxiety disorder).Condition stable.  Counseling on self-care measures. Plan to monitor clinically. Continue current medical plan.     Breast cancer screening.Condition stable.  Counseling on self-care measures. Plan to monitor clinically. Continue current medical plan.     Cervical cancer screening.Condition stable.  Counseling on self-care measures. Plan to monitor clinically. Continue current medical plan.       Future Appointments  Date Time Provider Department Center   4/9/2018 2:00 PM DONTE Souza [facility]   4/16/2018 2:00 PM DONTE Souza  3/28/2018  1:59 PM

## 2018-04-09 ENCOUNTER — OFFICE VISIT (OUTPATIENT)
Dept: PSYCHIATRY | Facility: CLINIC | Age: 63
End: 2018-04-09
Payer: COMMERCIAL

## 2018-04-09 DIAGNOSIS — F41.1 GENERALIZED ANXIETY DISORDER: Primary | ICD-10-CM

## 2018-04-09 PROCEDURE — 90834 PSYTX W PT 45 MINUTES: CPT | Mod: S$GLB,,, | Performed by: SOCIAL WORKER

## 2018-04-09 NOTE — PROGRESS NOTES
Individual Psychotherapy (PhD/LCSW)    4/9/2018    Site:  [facility]         Therapeutic Intervention: Met with patient.  Outpatient - Insight oriented psychotherapy 45 min - CPT code 10965    Chief complaint/reason for encounter: anxiety Diana      Interval history and content of current session:          She is significantly less worried.  Found it particularly useful not avoiding the worries but rather imagining how she could cope with negative outcomes.  Discussion over the lack of control over our lizzie and the impossibility of complete control.   Finds herself more present and using relaxation, mindfulness, rational responses, and exposure.                            Treatment plan:  · Target symptoms: anxiety    · Why chosen therapy is appropriate versus another modality: relevant to diagnosis  · Outcome monitoring methods: self-report, observation  · Therapeutic intervention type: behavior modifying psychotherapy    Risk parameters:  Patient reports no suicidal ideation  Patient reports no homicidal ideation  Patient reports no self-injurious behavior  Patient reports no violent behavior    Verbal deficits: None    Patient's response to intervention:  The patient's response to intervention is accepting.    Progress toward goals and other mental status changes:  The patient's progress toward goals is fair .    Diagnosis:   generalized anxiety     Plan:  individual psychotherapy    Return to clinic: 1 week    Length of Service (minutes): 45

## 2018-04-16 ENCOUNTER — OFFICE VISIT (OUTPATIENT)
Dept: PSYCHIATRY | Facility: CLINIC | Age: 63
End: 2018-04-16
Payer: COMMERCIAL

## 2018-04-16 DIAGNOSIS — F41.1 GENERALIZED ANXIETY DISORDER: Primary | ICD-10-CM

## 2018-04-16 PROCEDURE — 90834 PSYTX W PT 45 MINUTES: CPT | Mod: S$GLB,,, | Performed by: SOCIAL WORKER

## 2018-04-16 NOTE — PROGRESS NOTES
Individual Psychotherapy (PhD/LCSW)    4/16/2018    Site:  [facility]         Therapeutic Intervention: Met with patient.  Outpatient - Insight oriented psychotherapy 45 min - CPT code 22869    Chief complaint/reason for encounter: anxiety Diana      Interval history and content of current session:         Mother would inadvertently annoy father while he was intoxicated.  They would hide from him.   Pt vulnerable to others not liking her or approve of her.   Also at school she tried not to get close to people so that she would not have to bring them home (that way they would not know about her father).     Reading chapter on challenging your fears.     It would have been difficult for her to buy the dress for daughters wedding without asking others to see if it was appropriate.     Anxiety has been much better.                        Treatment plan:  · Target symptoms: anxiety    · Why chosen therapy is appropriate versus another modality: relevant to diagnosis  · Outcome monitoring methods: self-report, observation  · Therapeutic intervention type: behavior modifying psychotherapy    Risk parameters:  Patient reports no suicidal ideation  Patient reports no homicidal ideation  Patient reports no self-injurious behavior  Patient reports no violent behavior    Verbal deficits: None    Patient's response to intervention:  The patient's response to intervention is accepting.    Progress toward goals and other mental status changes:  The patient's progress toward goals is fair .    Diagnosis:   generalized anxiety     Plan:  individual psychotherapy    Return to clinic: 1 week    Length of Service (minutes): 45

## 2018-06-13 ENCOUNTER — OFFICE VISIT (OUTPATIENT)
Dept: PSYCHIATRY | Facility: CLINIC | Age: 63
End: 2018-06-13
Payer: COMMERCIAL

## 2018-06-13 DIAGNOSIS — F41.1 GENERALIZED ANXIETY DISORDER: Primary | ICD-10-CM

## 2018-06-13 PROCEDURE — 90834 PSYTX W PT 45 MINUTES: CPT | Mod: S$GLB,,, | Performed by: SOCIAL WORKER

## 2018-06-13 NOTE — PROGRESS NOTES
Individual Psychotherapy (PhD/LCSW)    6/13/2018    Site:  [facility]         Therapeutic Intervention: Met with patient.  Outpatient - Insight oriented psychotherapy 45 min - CPT code 98988    Chief complaint/reason for encounter: anxiety Diana      Interval history and content of current session:        Pt is doing relatively well.   She will be moving to [place] upon selling house which is now in the market.   She plans to travel with .  He smiles often.   Termination.   I discussed the uncertainty principles and the tag of war  As metaphor.   Pt expressed satisfaction and feeling she now has tools to help cope with life stress.                           Treatment plan:  · Target symptoms: anxiety    · Why chosen therapy is appropriate versus another modality: relevant to diagnosis  · Outcome monitoring methods: self-report, observation  · Therapeutic intervention type: behavior modifying psychotherapy    Risk parameters:  Patient reports no suicidal ideation  Patient reports no homicidal ideation  Patient reports no self-injurious behavior  Patient reports no violent behavior    Verbal deficits: None    Patient's response to intervention:  The patient's response to intervention is accepting.    Progress toward goals and other mental status changes:  The patient's progress toward goals is fair .    Diagnosis:   generalized anxiety     Plan:  individual psychotherapy    Return to clinic: termination.  Can return as needed but leaving state soon.    Length of Service (minutes): 45

## 2018-09-06 ENCOUNTER — PATIENT MESSAGE (OUTPATIENT)
Dept: INTERNAL MEDICINE | Facility: CLINIC | Age: 63
End: 2018-09-06

## 2018-09-06 DIAGNOSIS — F41.1 GAD (GENERALIZED ANXIETY DISORDER): ICD-10-CM

## 2018-09-10 RX ORDER — ESCITALOPRAM OXALATE 10 MG/1
10 TABLET ORAL DAILY
Qty: 90 TABLET | Refills: 1 | Status: SHIPPED | OUTPATIENT
Start: 2018-09-10 | End: 2018-10-16 | Stop reason: SDUPTHER

## 2018-10-16 DIAGNOSIS — F41.1 GAD (GENERALIZED ANXIETY DISORDER): ICD-10-CM

## 2018-10-17 RX ORDER — ESCITALOPRAM OXALATE 10 MG/1
10 TABLET ORAL DAILY
Qty: 90 TABLET | Refills: 1 | Status: SHIPPED | OUTPATIENT
Start: 2018-10-17 | End: 2019-10-17

## 2020-07-15 ENCOUNTER — PATIENT OUTREACH (OUTPATIENT)
Dept: ADMINISTRATIVE | Facility: HOSPITAL | Age: 65
End: 2020-07-15